# Patient Record
Sex: MALE | Race: WHITE
[De-identification: names, ages, dates, MRNs, and addresses within clinical notes are randomized per-mention and may not be internally consistent; named-entity substitution may affect disease eponyms.]

---

## 2018-07-05 ENCOUNTER — HOSPITAL ENCOUNTER (EMERGENCY)
Dept: HOSPITAL 96 - M.ERS | Age: 38
Discharge: HOME | End: 2018-07-05
Payer: OTHER GOVERNMENT

## 2018-07-05 VITALS — BODY MASS INDEX: 37.1 KG/M2 | WEIGHT: 265 LBS | HEIGHT: 71 IN

## 2018-07-05 VITALS — DIASTOLIC BLOOD PRESSURE: 75 MMHG | SYSTOLIC BLOOD PRESSURE: 140 MMHG

## 2018-07-05 DIAGNOSIS — F41.9: ICD-10-CM

## 2018-07-05 DIAGNOSIS — R73.9: Primary | ICD-10-CM

## 2018-07-05 DIAGNOSIS — F43.10: ICD-10-CM

## 2018-07-05 DIAGNOSIS — R94.6: ICD-10-CM

## 2018-07-05 LAB
ABSOLUTE BASOPHILS: 0.1 THOU/UL (ref 0–0.2)
ABSOLUTE EOSINOPHILS: 0.1 THOU/UL (ref 0–0.7)
ABSOLUTE MONOCYTES: 0.5 THOU/UL (ref 0–1.2)
ALBUMIN SERPL-MCNC: 3.4 G/DL (ref 3.4–5)
ALP SERPL-CCNC: 92 U/L (ref 46–116)
ALT SERPL-CCNC: 68 U/L (ref 30–65)
ANION GAP SERPL CALC-SCNC: 5 MMOL/L (ref 7–16)
AST SERPL-CCNC: 30 U/L (ref 15–37)
BASOPHILS NFR BLD AUTO: 0.7 %
BILIRUB SERPL-MCNC: 0.3 MG/DL
BILIRUB UR-MCNC: NEGATIVE MG/DL
BUN SERPL-MCNC: 17 MG/DL (ref 7–18)
CALCIUM SERPL-MCNC: 8.4 MG/DL (ref 8.5–10.1)
CHLORIDE SERPL-SCNC: 100 MMOL/L (ref 98–107)
CO2 SERPL-SCNC: 30 MMOL/L (ref 21–32)
COLOR UR: YELLOW
CREAT SERPL-MCNC: 1.3 MG/DL (ref 0.6–1.3)
EOSINOPHIL NFR BLD: 1.5 %
GLUCOSE SERPL-MCNC: 156 MG/DL (ref 70–99)
GRANULOCYTES NFR BLD MANUAL: 60.7 %
HCT VFR BLD CALC: 45.2 % (ref 42–52)
HGB BLD-MCNC: 15.5 GM/DL (ref 14–18)
KETONES UR STRIP-MCNC: NEGATIVE MG/DL
LYMPHOCYTES # BLD: 2.6 THOU/UL (ref 0.8–5.3)
LYMPHOCYTES NFR BLD AUTO: 30.8 %
MCH RBC QN AUTO: 31.5 PG (ref 26–34)
MCHC RBC AUTO-ENTMCNC: 34.2 G/DL (ref 28–37)
MCV RBC: 92.1 FL (ref 80–100)
MONOCYTES NFR BLD: 6.3 %
MPV: 7 FL. (ref 7.2–11.1)
NEUTROPHILS # BLD: 5.2 THOU/UL (ref 1.6–8.1)
NUCLEATED RBCS: 0 /100WBC
PLATELET COUNT*: 207 THOU/UL (ref 150–400)
POTASSIUM SERPL-SCNC: 3.7 MMOL/L (ref 3.5–5.1)
PROT SERPL-MCNC: 7.3 G/DL (ref 6.4–8.2)
PROT UR QL STRIP: NEGATIVE
RBC # BLD AUTO: 4.91 MIL/UL (ref 4.5–6)
RBC # UR STRIP: NEGATIVE /UL
RDW-CV: 12.9 % (ref 10.5–14.5)
SODIUM SERPL-SCNC: 135 MMOL/L (ref 136–145)
SP GR UR STRIP: 1.02 (ref 1–1.03)
TROPONIN-I LEVEL: <0.06 NG/ML (ref ?–0.06)
URINE CLARITY: CLEAR
URINE GLUCOSE-RANDOM: NEGATIVE
URINE LEUKOCYTES-REFLEX: NEGATIVE
URINE NITRITE-REFLEX: NEGATIVE
UROBILINOGEN UR STRIP-ACNC: 0.2 E.U./DL (ref 0.2–1)
WBC # BLD AUTO: 8.6 THOU/UL (ref 4–11)

## 2018-07-06 NOTE — EKG
[place] [address]
Phone:  [phone]                     ELECTROCARDIOGRAM REPORT      
_______________________________________________________________________________
 
Name:       NIKA SANCHEZ             [id number]      Account #:      [id number]  
Admission:  18     Attend Phys:                         
Discharge:  18     Date of Birth:  [birth year]  
         Report #: [id number]
    [id number]
_______________________________________________________________________________
THIS REPORT FOR:  //name//                      
 
                         [facility] ED
                                       
Test Date:    2018               Test Time:    22:34:17
Pat Name:     NIKA SAMGEORGINA         Department:   
Patient ID:   [id number]            Room:          
Gender:       M                        Technician:   CAROL
:          1980               Requested By: Galilea Garcia
Order Number: [id number] MD:   Roni Vanessa
                                 Measurements
Intervals                              Axis          
Rate:         80                       P:            58
TN:           149                      QRS:          37
QRSD:         90                       T:            27
QT:           359                                    
QTc:          415                                    
                           Interpretive Statements
Sinus rhythm
Baseline wander in lead(s) V4
No previous ECG available for comparison
 
Electronically Signed On 2018 10:26:08 CDT by Roni Vanessa
[email or web address]
 
 
 
 
 
 
 
 
 
 
 
 
 
 
 
 
 
 
 
  <ELECTRONICALLY SIGNED>
                                           By: Roni Vanessa MD, [facility]      
  18     1026
D: 18 2234   _____________________________________
T: 18 2234   Roni Vanessa MD, FACC        /EPI